# Patient Record
Sex: FEMALE | Race: WHITE
[De-identification: names, ages, dates, MRNs, and addresses within clinical notes are randomized per-mention and may not be internally consistent; named-entity substitution may affect disease eponyms.]

---

## 2018-03-19 ENCOUNTER — HOSPITAL ENCOUNTER (OUTPATIENT)
Dept: HOSPITAL 82 - ED | Age: 73
Setting detail: OBSERVATION
LOS: 2 days | Discharge: HOME | End: 2018-03-21
Attending: INTERNAL MEDICINE | Admitting: INTERNAL MEDICINE
Payer: MEDICARE

## 2018-03-19 VITALS — WEIGHT: 160.94 LBS | BODY MASS INDEX: 29.62 KG/M2 | HEIGHT: 62 IN

## 2018-03-19 DIAGNOSIS — I10: ICD-10-CM

## 2018-03-19 DIAGNOSIS — E78.5: ICD-10-CM

## 2018-03-19 DIAGNOSIS — H91.90: ICD-10-CM

## 2018-03-19 DIAGNOSIS — K52.9: Primary | ICD-10-CM

## 2018-03-19 DIAGNOSIS — E86.0: ICD-10-CM

## 2018-03-19 DIAGNOSIS — Z95.1: ICD-10-CM

## 2018-03-19 DIAGNOSIS — M19.90: ICD-10-CM

## 2018-03-19 DIAGNOSIS — I25.10: ICD-10-CM

## 2018-03-19 LAB
ALBUMIN SERPL-MCNC: 3.9 G/DL (ref 3.2–5)
ALP SERPL-CCNC: 125 U/L (ref 38–126)
ALT SERPL-CCNC: 28 U/L (ref 11–66)
ANION GAP SERPL CALCULATED.3IONS-SCNC: 18 MMOL/L
AST SERPL-CCNC: 25 U/L (ref 9–36)
BILIRUB UR QL STRIP.AUTO: NEGATIVE
BUN SERPL-MCNC: 20 MG/DL (ref 8–23)
BUN/CREAT SERPL: 27
CHLORIDE SERPL-SCNC: 100 MMOL/L (ref 95–108)
CLARITY UR: CLEAR
CO2 SERPL-SCNC: 24 MMOL/L (ref 22–30)
COLOR UR AUTO: YELLOW
CREAT SERPL-MCNC: 0.7 MG/DL (ref 0.5–1)
ERYTHROCYTE [DISTWIDTH] IN BLOOD BY AUTOMATED COUNT: 13.8 % (ref 11.5–15.5)
GLUCOSE UR STRIP.AUTO-MCNC: NEGATIVE MG/DL
HCT VFR BLD AUTO: 37.4 % (ref 37–47)
HGB BLD-MCNC: 12.1 G/DL (ref 12–16)
HGB UR QL STRIP.AUTO: (no result)
IMM GRANULOCYTES NFR BLD: 0.2 % (ref 0–1)
INR PPP: 1 RATIO (ref 0.7–1.3)
KETONES UR STRIP.AUTO-MCNC: (no result) MG/DL
LEUKOCYTE ESTERASE UR QL STRIP.AUTO: NEGATIVE
LIPASE SERPL-CCNC: 106 U/L (ref 23–300)
LYMPHOCYTES NFR BLD MANUAL: 11 % (ref 15–41)
MANUAL DIFF BLD: YES
MCH RBC QN AUTO: 28.4 PG  CALC (ref 26–32)
MCHC RBC AUTO-ENTMCNC: 32.4 G/L CALC (ref 32–36)
MCV RBC AUTO: 87.8 FL  CALC (ref 80–100)
MONOCYTES NFR BLD MANUAL: 3 % (ref 2–13)
NEUTS BAND NFR BLD MANUAL: 25 % (ref 0–8)
NEUTS SEG NFR BLD MANUAL: 61 % (ref 42–76)
NITRITE UR QL STRIP.AUTO: NEGATIVE
PH UR STRIP.AUTO: 5.5 [PH] (ref 4.5–8)
PLATELET # BLD AUTO: 175 THOU/UL (ref 130–400)
PLATELET BLD QL SMEAR: NORMAL
POTASSIUM SERPL-SCNC: 3.6 MMOL/L (ref 3.5–5.1)
PROT SERPL-MCNC: 6.9 G/DL (ref 6.3–8.2)
PROT UR QL STRIP.AUTO: NEGATIVE MG/DL
PROTHROMBIN TIME: 11.2 SECONDS (ref 9–12.5)
RBC # BLD AUTO: 4.26 MILL/UL (ref 4.2–5.6)
SODIUM SERPL-SCNC: 139 MMOL/L (ref 137–146)
SP GR UR STRIP.AUTO: 1.02
UROBILINOGEN UR QL STRIP.AUTO: 0.2 E.U./DL

## 2018-03-19 PROCEDURE — S0164 INJECTION PANTROPRAZOLE: HCPCS

## 2018-03-20 VITALS — SYSTOLIC BLOOD PRESSURE: 125 MMHG | DIASTOLIC BLOOD PRESSURE: 58 MMHG

## 2018-03-20 VITALS — DIASTOLIC BLOOD PRESSURE: 68 MMHG | SYSTOLIC BLOOD PRESSURE: 117 MMHG

## 2018-03-20 VITALS — DIASTOLIC BLOOD PRESSURE: 82 MMHG | SYSTOLIC BLOOD PRESSURE: 160 MMHG

## 2018-03-20 VITALS — DIASTOLIC BLOOD PRESSURE: 54 MMHG | SYSTOLIC BLOOD PRESSURE: 120 MMHG

## 2018-03-20 VITALS — DIASTOLIC BLOOD PRESSURE: 58 MMHG | SYSTOLIC BLOOD PRESSURE: 174 MMHG

## 2018-03-20 VITALS — SYSTOLIC BLOOD PRESSURE: 149 MMHG | DIASTOLIC BLOOD PRESSURE: 78 MMHG

## 2018-03-20 LAB
CHOLEST SERPL-MCNC: 85 MG/DL (ref 0–199)
CHOLEST/HDLC SERPL: 2.5 {RATIO}
ERYTHROCYTE [DISTWIDTH] IN BLOOD BY AUTOMATED COUNT: 13.9 % (ref 11.5–15.5)
HCT VFR BLD AUTO: 32.7 % (ref 37–47)
HDLC SERPL-MCNC: 34 MG/DL (ref 40–?)
HGB BLD-MCNC: 10.4 G/DL (ref 12–16)
LDLC SERPL CALC-MCNC: 39 MG/DL
MAGNESIUM SERPL-MCNC: 1.8 MG/DL (ref 1.6–2.3)
MCH RBC QN AUTO: 28.8 PG  CALC (ref 26–32)
MCHC RBC AUTO-ENTMCNC: 31.8 G/L CALC (ref 32–36)
MCV RBC AUTO: 90.6 FL  CALC (ref 80–100)
PLATELET # BLD AUTO: 144 THOU/UL (ref 130–400)
RBC # BLD AUTO: 3.61 MILL/UL (ref 4.2–5.6)
TRIGL SERPL-MCNC: 63 MG/DL (ref 30–149)
VLDLC SERPL CALC-MCNC: 13 MG/DL

## 2018-03-20 NOTE — NUR
REPORTED RECEIVED BEDSIDE FROM DAY NURSE, PT.IS UPRIGHT IN BED W/TV ON, POC
DISCUSSED W/PT AND DAYNURSE. PT.DENIES ANY SPECIFIC NEEDS AT THIS TIME. CALL
LIGHT IS W/IN REACH AND PT.HAS BEEN ENCOURAGED TO CALL IF ANY NEEDS ARISE.

## 2018-03-20 NOTE — NUR
PT.ASSISTED TO RESTROOM AND BACK TO BED, PT.LOCX3, PT.ASSESSED AND MEDICATED
AS ORDERS PROVIDE. LIGHTS ARE OUT AND TV ON, CALL LIGHT W/IN REACH.

## 2018-03-20 NOTE — NUR
PT DENIES ANY PAIN OR DISCOMFORT. RESP EVEN AND UNLABORED WITH O2 IN PLACE.
BED ALARM ON FOR SAFETY. CALL LIGHT WITHIN REACH.

## 2018-03-20 NOTE — NUR
DR NOTIFIED OF ADMIT. NEW ORDERS RECIEVED AT THIS TIME; NS @125. D/C TYLENOL
325MG PO Q6H, KEEP TYLENOL 650 MG PO PRN ( FEVER, PAIN). DIET; CLEAR LIQUID.
TORB.

## 2018-03-20 NOTE — NUR
PT ARRIVED TO FLOOR VIA STRETCHER WITH ER NURSE. PT AMBULATED TO BED. PT HARD
OF HEARING. PT ORIENTED TO ROOM AND CALL LIGHT SYSTEM. VITAL SIGNS OBTAINED.
RESP EVEN AND UNLABORED WITH O2 IN PLACE. ABD DIST; SOFT. ACTIVE BOWEL SOUNDS
NOTED. TRACE ANKLE EDEMA; PEDAL PULSES PALPATED BILAT. IV RAC PATENT; FLUSHED
WITHOUT DIFFICULTY. NEW IV START #22 LEFT HAND; FLUSHED WITHOUT DIFFICULTY. PT
ENCOURAGED TO CALL FOR ASSISTANCE. SAFETY PRECAUTIONS REINFORCED. CALL LIGHT
WITHIN REACH. FREQUENT ROUNDS MADE; WILL MONITOR CLOSELY.

## 2018-03-20 NOTE — NUR
BEDSIDE REPORT RECEIVED FROM CLIVE KELLY. PT SUPINE IN BED. SLEEPING. CALL
LIGHT WITHIN REACH. IVF INFUSING TO #18 RAC, NO REDNESS/SWELLING. WILL
CONTINUE TO MONITOR.

## 2018-03-20 NOTE — NUR
PT AMBULATED TO RESTROOM. STEADY GAIT. FALL PRECAUTIONS REINFORCED. PLAN OF
CARE DISCUSSED. PT STATES ANTICIPATION OF DISCHARGE. DISCHARGE PROCESS
REVIEWED. PT DENIES PAIN. NO NAUSEA.

## 2018-03-21 VITALS — DIASTOLIC BLOOD PRESSURE: 66 MMHG | SYSTOLIC BLOOD PRESSURE: 143 MMHG

## 2018-03-21 VITALS — SYSTOLIC BLOOD PRESSURE: 136 MMHG | DIASTOLIC BLOOD PRESSURE: 57 MMHG

## 2018-03-21 LAB
ANION GAP SERPL CALCULATED.3IONS-SCNC: 13 MMOL/L
BUN SERPL-MCNC: 8 MG/DL (ref 8–23)
BUN/CREAT SERPL: 11
C DIFF TOX A+B STL QL: NEGATIVE
CHLORIDE SERPL-SCNC: 110 MMOL/L (ref 95–108)
CO2 SERPL-SCNC: 23 MMOL/L (ref 22–30)
CREAT SERPL-MCNC: 0.7 MG/DL (ref 0.5–1)
ERYTHROCYTE [DISTWIDTH] IN BLOOD BY AUTOMATED COUNT: 14.1 % (ref 11.5–15.5)
HCT VFR BLD AUTO: 31.4 % (ref 37–47)
HGB BLD-MCNC: 10.1 G/DL (ref 12–16)
MAGNESIUM SERPL-MCNC: 1.8 MG/DL (ref 1.6–2.3)
MCH RBC QN AUTO: 28.9 PG  CALC (ref 26–32)
MCHC RBC AUTO-ENTMCNC: 32.2 G/L CALC (ref 32–36)
MCV RBC AUTO: 89.7 FL  CALC (ref 80–100)
PLATELET # BLD AUTO: 149 THOU/UL (ref 130–400)
POTASSIUM SERPL-SCNC: 3.5 MMOL/L (ref 3.5–5.1)
RBC # BLD AUTO: 3.5 MILL/UL (ref 4.2–5.6)
SODIUM SERPL-SCNC: 142 MMOL/L (ref 137–146)

## 2018-03-21 NOTE — NUR
RECEIVED BEDSIDE REPORT FROM SHAKIRA ANDREW. RESTING IN SEMI FOWLERS WITH EYES
CLOSED, AWAKENS EASILY. RESPS EVEN AND UNLABORED ON O2 VIA NC. #22 LH INFUSING
WITHOUT DIFFICULTY, SITE APPEARS HEALTHY. DENIES PAIN OR DISCOMFORT. PLAN OF
CARE DISCUSSED. SAFETY PRECAUTIONS REINFORCED. BED IN LOWEST POSITION WITH
WHEELS LOCKED. CALL LIGHT WITHIN REACH. ENCOURAGED PT TO CALL FOR ANY NEEDS.

## 2018-03-21 NOTE — NUR
Discharge instructions given. Patient verbalizes understanding of same.
Discharged in stable condition via Wheelchair to Home with
spouse. All belongings sent with pt.

## 2018-03-21 NOTE — NUR
SITTING ON EDGE OF BED.  AT BEDSIDE. RESPS EVEN AND UNLABORED ON ROOM
AIR. DENIES PAIN OR DISCOMFORT. #22 LH INFUSING WITHOUT DIFFICULTY, SITE
APPEARS HEALTHY. CALL LIGHT WITHIN REACH. WILL CONTINUE TO MONITOR.